# Patient Record
Sex: MALE | URBAN - METROPOLITAN AREA
[De-identification: names, ages, dates, MRNs, and addresses within clinical notes are randomized per-mention and may not be internally consistent; named-entity substitution may affect disease eponyms.]

---

## 2024-06-17 ENCOUNTER — APPOINTMENT (RX ONLY)
Dept: URBAN - METROPOLITAN AREA CLINIC 374 | Facility: CLINIC | Age: 49
Setting detail: DERMATOLOGY
End: 2024-06-17

## 2024-06-17 DIAGNOSIS — L21.8 OTHER SEBORRHEIC DERMATITIS: ICD-10-CM

## 2024-06-17 PROCEDURE — ? TREATMENT GOALS

## 2024-06-17 PROCEDURE — ? PRESCRIPTION

## 2024-06-17 PROCEDURE — ? COUNSELING

## 2024-06-17 PROCEDURE — ? PRESCRIPTION MEDICATION MANAGEMENT

## 2024-06-17 PROCEDURE — 99204 OFFICE O/P NEW MOD 45 MIN: CPT

## 2024-06-17 RX ORDER — HYDROCORTISONE 25 MG/G
OINTMENT TOPICAL BID
Qty: 28.35 | Refills: 6 | Status: ERX | COMMUNITY
Start: 2024-06-17

## 2024-06-17 RX ORDER — CLOBETASOL PROPIONATE 0.5 MG/ML
SOLUTION TOPICAL BID
Qty: 50 | Refills: 6 | Status: ERX | COMMUNITY
Start: 2024-06-17

## 2024-06-17 RX ORDER — KETOCONAZOLE 20 MG/ML
SHAMPOO, SUSPENSION TOPICAL
Qty: 120 | Refills: 6 | Status: ERX | COMMUNITY
Start: 2024-06-17

## 2024-06-17 RX ORDER — KETOCONAZOLE 20 MG/G
CREAM TOPICAL BID
Qty: 60 | Refills: 6 | Status: ERX | COMMUNITY
Start: 2024-06-17

## 2024-06-17 RX ADMIN — CLOBETASOL PROPIONATE: 0.5 SOLUTION TOPICAL at 00:00

## 2024-06-17 RX ADMIN — HYDROCORTISONE: 25 OINTMENT TOPICAL at 00:00

## 2024-06-17 RX ADMIN — KETOCONAZOLE: 20 SHAMPOO, SUSPENSION TOPICAL at 00:00

## 2024-06-17 RX ADMIN — KETOCONAZOLE: 20 CREAM TOPICAL at 00:00

## 2024-06-17 ASSESSMENT — LOCATION DETAILED DESCRIPTION DERM
LOCATION DETAILED: LEFT CENTRAL OCCIPITAL SCALP
LOCATION DETAILED: POSTERIOR MID-PARIETAL SCALP
LOCATION DETAILED: RIGHT CENTRAL EYEBROW
LOCATION DETAILED: RIGHT INFERIOR CRUS OF ANTIHELIX
LOCATION DETAILED: LEFT INFERIOR CRUS OF ANTIHELIX
LOCATION DETAILED: LEFT CENTRAL EYEBROW
LOCATION DETAILED: RIGHT INFERIOR OCCIPITAL SCALP
LOCATION DETAILED: LEFT INFERIOR MEDIAL MALAR CHEEK

## 2024-06-17 ASSESSMENT — LOCATION SIMPLE DESCRIPTION DERM
LOCATION SIMPLE: SCALP
LOCATION SIMPLE: LEFT EYEBROW
LOCATION SIMPLE: RIGHT EYEBROW
LOCATION SIMPLE: RIGHT EAR
LOCATION SIMPLE: LEFT CHEEK
LOCATION SIMPLE: POSTERIOR SCALP
LOCATION SIMPLE: LEFT EAR

## 2024-06-17 ASSESSMENT — LOCATION ZONE DERM
LOCATION ZONE: FACE
LOCATION ZONE: EAR
LOCATION ZONE: SCALP

## 2024-06-17 NOTE — HPI: OTHER
Condition:: \"Rash\" on the scalp and face
Please Describe Your Condition:: \"Rash\" on the scalp and face, intermittently-appearing for the past months. Associated with pruritus. No obvious inciting or modifying factors. Mild to moderate in severity. No previous treatments.

## 2024-06-17 NOTE — PROCEDURE: PRESCRIPTION MEDICATION MANAGEMENT
Render In Strict Bullet Format?: No
Detail Level: Zone
Plan: Re-evaluate in 6 months
Initiate Treatment: FOR THE SCALP:\\n-Clobetasol 0.05 % scalp solution Scalp Frequency: BID Sig: Apply a few scattered drops the scalp BID x 2 weeks when flaky/flaring, then take 2 weeks off. Do not use more than 2 weeks without taking a 2 week break in between. Avoid getting into eyes\\n\\n-Ketoconazole 2 % shampoo TP Sig: Lather into scalp at each wash, let sit for 10-15 minutes then rinse out. Use 2-3x/week\\n\\n--->Recommend the following Over-The-Counter treatment(s): Selsun Blue, TGel, TSal or Head & Shoulders shampoo to alternate with ketoconazole 2% shampoo\\n\\nFOR THE FACE: \\n-Hydrocortisone 2.5 % ointment or cream: Apply to affected skin twice a day for no more than 2-3 weeks in a row\\n\\n-Ketoconazole 2 % cream: Apply to affected skin twice a day

## 2024-07-29 ENCOUNTER — APPOINTMENT (RX ONLY)
Dept: URBAN - METROPOLITAN AREA CLINIC 374 | Facility: CLINIC | Age: 49
Setting detail: DERMATOLOGY
End: 2024-07-29

## 2024-07-29 DIAGNOSIS — L21.8 OTHER SEBORRHEIC DERMATITIS: ICD-10-CM | Status: IMPROVED

## 2024-07-29 PROCEDURE — 99213 OFFICE O/P EST LOW 20 MIN: CPT

## 2024-07-29 PROCEDURE — ? PRESCRIPTION MEDICATION MANAGEMENT

## 2024-07-29 PROCEDURE — ? COUNSELING

## 2024-07-29 PROCEDURE — ? TREATMENT GOALS

## 2024-07-29 PROCEDURE — ? PRESCRIPTION

## 2024-07-29 RX ORDER — KETOCONAZOLE 20 MG/G
CREAM TOPICAL BID
Qty: 60 | Refills: 6 | Status: ERX

## 2024-07-29 RX ORDER — CLOBETASOL PROPIONATE 0.5 MG/ML
SOLUTION TOPICAL BID
Qty: 50 | Refills: 6 | Status: ERX

## 2024-07-29 RX ORDER — KETOCONAZOLE 20 MG/ML
SHAMPOO, SUSPENSION TOPICAL
Qty: 120 | Refills: 6 | Status: ERX

## 2024-07-29 RX ORDER — HYDROCORTISONE 25 MG/G
OINTMENT TOPICAL BID
Qty: 28.35 | Refills: 6 | Status: ERX

## 2024-07-29 ASSESSMENT — LOCATION DETAILED DESCRIPTION DERM
LOCATION DETAILED: LEFT CENTRAL OCCIPITAL SCALP
LOCATION DETAILED: POSTERIOR MID-PARIETAL SCALP
LOCATION DETAILED: RIGHT INFERIOR OCCIPITAL SCALP
LOCATION DETAILED: LEFT CENTRAL EYEBROW
LOCATION DETAILED: LEFT INFERIOR CRUS OF ANTIHELIX
LOCATION DETAILED: RIGHT INFERIOR CRUS OF ANTIHELIX
LOCATION DETAILED: LEFT INFERIOR MEDIAL MALAR CHEEK
LOCATION DETAILED: RIGHT CENTRAL EYEBROW

## 2024-07-29 ASSESSMENT — LOCATION SIMPLE DESCRIPTION DERM
LOCATION SIMPLE: RIGHT EYEBROW
LOCATION SIMPLE: LEFT EAR
LOCATION SIMPLE: LEFT CHEEK
LOCATION SIMPLE: LEFT EYEBROW
LOCATION SIMPLE: POSTERIOR SCALP
LOCATION SIMPLE: RIGHT EAR
LOCATION SIMPLE: SCALP

## 2024-07-29 ASSESSMENT — LOCATION ZONE DERM
LOCATION ZONE: EAR
LOCATION ZONE: FACE
LOCATION ZONE: SCALP

## 2025-02-28 ENCOUNTER — APPOINTMENT (EMERGENCY)
Dept: RADIOLOGY | Facility: HOSPITAL | Age: 50
End: 2025-02-28
Payer: COMMERCIAL

## 2025-02-28 ENCOUNTER — HOSPITAL ENCOUNTER (EMERGENCY)
Facility: HOSPITAL | Age: 50
Discharge: HOME | End: 2025-02-28
Attending: EMERGENCY MEDICINE | Admitting: EMERGENCY MEDICINE
Payer: COMMERCIAL

## 2025-02-28 VITALS
DIASTOLIC BLOOD PRESSURE: 81 MMHG | OXYGEN SATURATION: 97 % | TEMPERATURE: 98.1 F | SYSTOLIC BLOOD PRESSURE: 153 MMHG | RESPIRATION RATE: 24 BRPM | WEIGHT: 218 LBS | HEART RATE: 97 BPM | BODY MASS INDEX: 29.53 KG/M2 | HEIGHT: 72 IN

## 2025-02-28 DIAGNOSIS — M79.601 RIGHT ARM PAIN: Primary | ICD-10-CM

## 2025-02-28 PROCEDURE — 99284 EMERGENCY DEPT VISIT MOD MDM: CPT

## 2025-02-28 PROCEDURE — 63600000 HC DRUGS/DETAIL CODE: Mod: JZ | Performed by: PHYSICIAN ASSISTANT

## 2025-02-28 PROCEDURE — 73080 X-RAY EXAM OF ELBOW: CPT | Mod: RT

## 2025-02-28 PROCEDURE — 3E0233Z INTRODUCTION OF ANTI-INFLAMMATORY INTO MUSCLE, PERCUTANEOUS APPROACH: ICD-10-PCS | Performed by: EMERGENCY MEDICINE

## 2025-02-28 PROCEDURE — 73090 X-RAY EXAM OF FOREARM: CPT | Mod: RT

## 2025-02-28 PROCEDURE — 96372 THER/PROPH/DIAG INJ SC/IM: CPT

## 2025-02-28 RX ORDER — KETOROLAC TROMETHAMINE 30 MG/ML
30 INJECTION, SOLUTION INTRAMUSCULAR; INTRAVENOUS ONCE
Status: COMPLETED | OUTPATIENT
Start: 2025-02-28 | End: 2025-02-28

## 2025-02-28 RX ADMIN — KETOROLAC TROMETHAMINE 30 MG: 30 INJECTION, SOLUTION INTRAMUSCULAR at 08:10

## 2025-02-28 NOTE — ED PROVIDER NOTES
"Emergency Medicine Note  HPI   HISTORY OF PRESENT ILLNESS     50 y/o male with no significant PMHx presents c/o R elbow pain since May, worse over last few days. Notes he had an \"impact injury\" several months ago, pain did improve, but recently has become worse again. 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 States he did not take anything for the pain as he is not allowed to when driving. Denies recent falls/trauma, skin color changes, paresthesias, fevers or edema. Notes he has been seen at [facility] in the past and required injections in that elbow.          Patient History   PAST HISTORY     Reviewed from Nursing Triage:       No past medical history on file.    No past surgical history on file.    No family history on file.           Review of Systems   REVIEW OF SYSTEMS     Review of Systems      VITALS     ED Vitals      Date/Time Temp Pulse Resp BP SpO2 [facility]   02/28/25 1054 -- 97 24 153/81 97 % [facility]   02/28/25 0820 -- -- -- 157/99 -- DP   02/28/25 0811 -- 89 24 157/99 99 % [facility]   02/28/25 0601 36.7 °C (98.1 °F) 108 18 170/104 99 % [facility]          Pulse Ox %: 99 % (02/28/25 0709)  Pulse Ox Interpretation: Normal (02/28/25 0709)           Physical Exam   PHYSICAL EXAM     Physical Exam  Vitals and nursing note reviewed.   Constitutional:       Appearance: He is well-developed.   HENT:      Head: Normocephalic and atraumatic.   Cardiovascular:      Rate and Rhythm: Normal rate and regular rhythm.   Pulmonary:      Effort: Pulmonary effort is normal. No respiratory distress.      Breath sounds: Normal breath sounds. No wheezing.   Musculoskeletal:         General: No deformity. Normal range of motion.      Comments: No edema, erythema, or deformity of R arm. Some ttp just below antecubital fossa ulnar aspect. FROM of R elbow. Good  strength R hand. Radial pulse 2+.   Skin:     General: Skin is warm and dry.   Neurological:      Mental Status: He is alert and oriented to person, " place, and time.   Psychiatric:         Behavior: Behavior normal.           PROCEDURES     Procedures     DATA     Results       None            Imaging Results              X-RAY ELBOW RIGHT 3+ VIEWS (Final result)  Result time 02/28/25 10:32:43      Final result                   Impression:    IMPRESSION:  No acute osseous abnormality in the right elbow or forearm.               Narrative:    CLINICAL HISTORY: R elbow pain. Nerve injury to wrist/shoulder.    COMMENT:  Technique: Frontal, lateral, and bilateral oblique views of the right elbow.  Frontal and lateral views of the right forearm.  Comparison: None    No acute fracture or dislocation. Joints appear preserved and without  degenerative change. Normal bone mineralization. Unremarkable soft tissues. No  elbow effusion.                                       X-RAY FOREARM RIGHT 2 VIEWS (Final result)  Result time 02/28/25 10:32:43      Final result                   Impression:    IMPRESSION:  No acute osseous abnormality in the right elbow or forearm.               Narrative:    CLINICAL HISTORY: R elbow pain. Nerve injury to wrist/shoulder.    COMMENT:  Technique: Frontal, lateral, and bilateral oblique views of the right elbow.  Frontal and lateral views of the right forearm.  Comparison: None    No acute fracture or dislocation. Joints appear preserved and without  degenerative change. Normal bone mineralization. Unremarkable soft tissues. No  elbow effusion.                        Preliminary Interpretation    NAD. Reviewed with Dr. Bailey.                                    No orders to display       Scoring tools                                  ED Course & MDM   MDM / ED COURSE / CLINICAL IMPRESSION / DISPO     Medical Decision Making  48 y/o male with R arm pain over last few days. No falls/trauma. Notes  and is R hand dominant. Suspect tendinopathy from overuse. No signs of infection, bursitis or fracture. Toradol given for pain. Xray  with no acute findings. Discussed f/u with ortho.    Amount and/or Complexity of Data Reviewed  Radiology: ordered and independent interpretation performed. Decision-making details documented in ED Course.     Details: Xray reviewed and agree with radiologist's interpretation      Risk  OTC drugs.  Prescription drug management.        ED Course as of 02/28/25 1112   Fri Feb 28, 2025   1019 Patient states pain in elbow feels better. [CP]      ED Course User Index  [CP] Christie Valenzuela PA C     Clinical Impression      Right arm pain     _________________       ED Disposition   Discharge                       Christie Valenzuela PA C  02/28/25 1112

## 2025-02-28 NOTE — ED ATTESTATION NOTE
The patient was evaluated and managed by the physician assistant / nurse practitioner.       Pablo Bailey MD  02/28/25 112

## 2025-02-28 NOTE — Clinical Note
Brain Garcia was seen and treated in our emergency department on 2/28/2025.  Barin Garcia may return to work on 03/04/2025.       If you have any questions or concerns, please don't hesitate to call.      Christie Valenzuela PA C